# Patient Record
Sex: MALE | Race: OTHER | Employment: FULL TIME | URBAN - METROPOLITAN AREA
[De-identification: names, ages, dates, MRNs, and addresses within clinical notes are randomized per-mention and may not be internally consistent; named-entity substitution may affect disease eponyms.]

---

## 2024-06-06 ENCOUNTER — APPOINTMENT (EMERGENCY)
Dept: RADIOLOGY | Facility: HOSPITAL | Age: 34
End: 2024-06-06
Payer: COMMERCIAL

## 2024-06-06 ENCOUNTER — HOSPITAL ENCOUNTER (EMERGENCY)
Facility: HOSPITAL | Age: 34
Discharge: HOME | End: 2024-06-06
Attending: EMERGENCY MEDICINE | Admitting: EMERGENCY MEDICINE
Payer: COMMERCIAL

## 2024-06-06 VITALS
WEIGHT: 218.92 LBS | DIASTOLIC BLOOD PRESSURE: 77 MMHG | TEMPERATURE: 99.5 F | RESPIRATION RATE: 16 BRPM | BODY MASS INDEX: 32.33 KG/M2 | SYSTOLIC BLOOD PRESSURE: 127 MMHG | OXYGEN SATURATION: 99 % | HEART RATE: 93 BPM

## 2024-06-06 DIAGNOSIS — J02.0 STREP PHARYNGITIS: Primary | ICD-10-CM

## 2024-06-06 LAB — S PYO DNA THROAT QL NAA+PROBE: DETECTED

## 2024-06-06 PROCEDURE — 71046 X-RAY EXAM CHEST 2 VIEWS: CPT

## 2024-06-06 PROCEDURE — 0241U HB NFCT DS VIR RESP RNA 4 TRGT: CPT

## 2024-06-06 PROCEDURE — 87651 STREP A DNA AMP PROBE: CPT

## 2024-06-06 PROCEDURE — 99285 EMERGENCY DEPT VISIT HI MDM: CPT

## 2024-06-06 PROCEDURE — 99284 EMERGENCY DEPT VISIT MOD MDM: CPT

## 2024-06-06 RX ORDER — AMOXICILLIN 250 MG/1
500 CAPSULE ORAL ONCE
Status: COMPLETED | OUTPATIENT
Start: 2024-06-06 | End: 2024-06-06

## 2024-06-06 RX ORDER — DEXAMETHASONE 4 MG/1
6 TABLET ORAL ONCE
Status: COMPLETED | OUTPATIENT
Start: 2024-06-06 | End: 2024-06-06

## 2024-06-06 RX ORDER — NAPROXEN 500 MG/1
500 TABLET ORAL 2 TIMES DAILY WITH MEALS
Qty: 30 TABLET | Refills: 0 | Status: SHIPPED | OUTPATIENT
Start: 2024-06-06

## 2024-06-06 RX ORDER — LIDOCAINE HYDROCHLORIDE 20 MG/ML
15 SOLUTION OROPHARYNGEAL ONCE
Status: COMPLETED | OUTPATIENT
Start: 2024-06-06 | End: 2024-06-06

## 2024-06-06 RX ORDER — AMOXICILLIN 500 MG/1
500 CAPSULE ORAL EVERY 12 HOURS SCHEDULED
Qty: 20 CAPSULE | Refills: 0 | Status: SHIPPED | OUTPATIENT
Start: 2024-06-06 | End: 2024-06-16

## 2024-06-06 RX ADMIN — AMOXICILLIN 500 MG: 250 CAPSULE ORAL at 22:56

## 2024-06-06 RX ADMIN — LIDOCAINE HYDROCHLORIDE 15 ML: 20 SOLUTION ORAL at 22:24

## 2024-06-06 RX ADMIN — DEXAMETHASONE 6 MG: 4 TABLET ORAL at 22:24

## 2024-06-06 NOTE — Clinical Note
Prabhjot Calderafabybrissa was seen and treated in our emergency department on 6/6/2024.                Diagnosis: Pharyngitis    Prabhjot  .    He may return on this date: 06/10/2024         If you have any questions or concerns, please don't hesitate to call.      Andre Ellington PA-C    ______________________________           _______________          _______________  Hospital Representative                              Date                                Time

## 2024-06-07 LAB
FLUAV RNA RESP QL NAA+PROBE: NEGATIVE
FLUBV RNA RESP QL NAA+PROBE: NEGATIVE
RSV RNA RESP QL NAA+PROBE: NEGATIVE
SARS-COV-2 RNA RESP QL NAA+PROBE: NEGATIVE

## 2024-06-07 NOTE — ED PROVIDER NOTES
History  Chief Complaint   Patient presents with    Fever     Fever started this am, Took tylenol at 2045 and 30 mins later had a temp of 101, neck, stomach and throat pain, +cough with yellow phlegm,      Patient 34-year-old male presented ED with a chief complaint of fevers and sore throat.  Patient has significant past medical history of substance use disorder.  Patient states that today he has been sore throat.  Patient stated he also has a cough where he is coughing up some whitish-yellow sputum.  Denies any sick contacts or being seen by another provider.  Patient stated that before taking Tylenol that he also had intense sore throat in which he felt like he has had some shortness of breath.  Patient denies any trouble swallowing but he said that it is painful.  Denies any decrease in oral intake.  Patient stated that his fever today was high as 101.  States he took Tylenol before coming to the ED today.  Patient denies any feeling of obstruction.  Denies any trismus.  States that throughout some chills with fever.  Patient also stated that he had nonspecific abdominal pain but he also has experienced this on a normal basis.  Patient rates the pain a 7 out of 10 stating that the Tylenol did help some.Patient denies any chest pain, shortness of breath, vomiting, diarrhea, chills, diaphoresis, fevers, loss of consciousness, syncope, urinary and bowel changes, abdominal pain, visual symptoms, vision loss, loss of function, loss of sensation, decreased oral intake, hemoptysis, hematochezia, hematemesis, melena, confusion.       ff    Prior to Admission Medications   Prescriptions Last Dose Informant Patient Reported? Taking?   naproxen (EC NAPROSYN) 500 MG EC tablet   No No   Sig: Take 1 tablet (500 mg total) by mouth in the morning and 1 tablet (500 mg total) in the evening. Take with meals.   oxyCODONE (ROXICODONE) 5 immediate release tablet   No No   Sig: Take 1 tablet (5 mg total) by mouth every 6 (six) hours  as needed for severe pain for up to 12 doses Max Daily Amount: 20 mg      Facility-Administered Medications: None       Past Medical History:   Diagnosis Date    Drug addiction in remission (HCC)     clean since 2019    History of transfusion     as an infant    No known health problems     Personal history of COVID-19 01/28/2022    mild symptoms, recovered at home       Past Surgical History:   Procedure Laterality Date    ELBOW SURGERY Right     IN OPEN TX PHALANGEAL SHAFT FRACTURE PROX/MIDDLE EA Right 5/23/2022    Procedure: OPEN REDUCTION W/ INTERNAL FIXATION (ORIF) HAND, 4th and 5th metacarpal ORIF and all necessary reconstructive procedures;  Surgeon: Jeff Lozada;  Location:  MAIN OR;  Service: Orthopedics    TOOTH EXTRACTION         No family history on file.  I have reviewed and agree with the history as documented.    E-Cigarette/Vaping    E-Cigarette Use Never User      E-Cigarette/Vaping Substances     Social History     Tobacco Use    Smoking status: Every Day     Current packs/day: 1.00     Average packs/day: 1 pack/day for 15.0 years (15.0 ttl pk-yrs)     Types: Cigarettes    Smokeless tobacco: Never   Vaping Use    Vaping status: Never Used   Substance Use Topics    Alcohol use: Not Currently     Comment: stopped drinking alcohol when stopped using cocaine & heroin    Drug use: Not Currently     Types: Heroin, Cocaine     Comment: wife states no drug use since end 2019       Review of Systems   Constitutional:  Negative for chills, diaphoresis, fatigue and fever.   HENT:  Positive for sore throat. Negative for congestion, ear discharge, ear pain, postnasal drip, rhinorrhea, sinus pressure and sinus pain.    Eyes:  Negative for photophobia, pain, discharge, itching and visual disturbance.   Respiratory:  Positive for cough. Negative for chest tightness and shortness of breath.    Cardiovascular:  Negative for chest pain and palpitations.   Gastrointestinal:  Negative for abdominal distention,  abdominal pain, constipation, diarrhea, nausea and vomiting.   Genitourinary:  Negative for difficulty urinating and dysuria.   Musculoskeletal:  Positive for myalgias and neck pain. Negative for arthralgias, back pain, joint swelling and neck stiffness.   Skin:  Negative for rash and wound.   Neurological:  Negative for dizziness, tremors, syncope, facial asymmetry, weakness, light-headedness, numbness and headaches.   Hematological:  Positive for adenopathy.       Physical Exam  Physical Exam  Vitals and nursing note reviewed.   Constitutional:       General: He is not in acute distress.     Appearance: Normal appearance. He is normal weight. He is not ill-appearing, toxic-appearing or diaphoretic.   HENT:      Head: Normocephalic.      Right Ear: Tympanic membrane, ear canal and external ear normal.      Left Ear: Tympanic membrane, ear canal and external ear normal.      Nose: Nose normal. No congestion.      Mouth/Throat:      Mouth: Mucous membranes are moist.      Pharynx: Oropharyngeal exudate and posterior oropharyngeal erythema present.      Comments: Bilateral tonsillar edema and erythema.  Exudates present on exam.  Airway is patent.  No signs of PTA on exam.  Uvula midline  Eyes:      General:         Right eye: No discharge.         Left eye: No discharge.      Extraocular Movements: Extraocular movements intact.      Conjunctiva/sclera: Conjunctivae normal.      Pupils: Pupils are equal, round, and reactive to light.   Cardiovascular:      Rate and Rhythm: Normal rate and regular rhythm.      Pulses: Normal pulses.   Pulmonary:      Effort: Pulmonary effort is normal. No respiratory distress.      Breath sounds: Normal breath sounds. No stridor. No wheezing, rhonchi or rales.   Chest:      Chest wall: No tenderness.   Abdominal:      General: Bowel sounds are normal. There is no distension.      Palpations: Abdomen is soft.      Tenderness: There is no abdominal tenderness. There is no right CVA  tenderness, left CVA tenderness or guarding.   Musculoskeletal:         General: No tenderness. Normal range of motion.      Cervical back: Normal range of motion and neck supple.   Lymphadenopathy:      Cervical: Cervical adenopathy present.   Skin:     General: Skin is warm and dry.      Capillary Refill: Capillary refill takes less than 2 seconds.      Findings: No rash.   Neurological:      Mental Status: He is alert and oriented to person, place, and time.   Psychiatric:         Mood and Affect: Mood normal.         Vital Signs  ED Triage Vitals [06/06/24 2141]   Temperature Pulse Respirations Blood Pressure SpO2   99.5 °F (37.5 °C) (!) 112 20 146/65 96 %      Temp Source Heart Rate Source Patient Position - Orthostatic VS BP Location FiO2 (%)   Oral Monitor Sitting Right arm --      Pain Score       7           Vitals:    06/06/24 2141 06/06/24 2246   BP: 146/65 127/77   Pulse: (!) 112 93   Patient Position - Orthostatic VS: Sitting Sitting         Visual Acuity      ED Medications  Medications   dexamethasone (DECADRON) tablet 6 mg (6 mg Oral Given 6/6/24 2224)   Lidocaine Viscous HCl (XYLOCAINE) 2 % mucosal solution 15 mL (15 mL Swish & Spit Given 6/6/24 2224)   amoxicillin (AMOXIL) capsule 500 mg (500 mg Oral Given 6/6/24 2256)       Diagnostic Studies  Results Reviewed       Procedure Component Value Units Date/Time    FLU/RSV/COVID - if FLU/RSV clinically relevant [[id number]]  (Normal) Collected: 06/06/24 2217    Lab Status: Final result Specimen: Nares from Nose Updated: 06/07/24 0026     SARS-CoV-2 Negative     INFLUENZA A PCR Negative     INFLUENZA B PCR Negative     RSV PCR Negative    Narrative:      FOR PEDIATRIC PATIENTS - copy/paste COVID Guidelines URL to browser: [email or web address]    SARS-CoV-2 assay is a Nucleic Acid Amplification assay intended for the  qualitative detection of nucleic acid from SARS-CoV-2 in nasopharyngeal  swabs.  Results are for the presumptive identification of SARS-CoV-2 RNA.    Positive results are indicative of infection with SARS-CoV-2, the virus  causing COVID-19, but do not rule out bacterial infection or co-infection  with other viruses. Laboratories within the [place] and its  territories are required to report all positive results to the appropriate  public health authorities. Negative results do not preclude SARS-CoV-2  infection and should not be used as the sole basis for treatment or other  patient management decisions. Negative results must be combined with  clinical observations, patient history, and epidemiological information.  This test has not been FDA cleared or approved.    This test has been authorized by FDA under an Emergency Use Authorization  (EUA). This test is only authorized for the duration of time the  declaration that circumstances exist justifying the authorization of the  emergency use of an in vitro diagnostic tests for detection of SARS-CoV-2  virus and/or diagnosis of COVID-19 infection under section 564(b)(1) of  the Act, 21 U.S.C. 360bbb-3(b)(1), unless the authorization is terminated  or revoked sooner. The test has been validated but independent review by FDA  and CLIA is pending.    Test performed using [employer] GeneXpert: This RT-PCR assay targets N2,  a region unique to SARS-CoV-2. A conserved region in the E-gene was chosen  for pan-Sarbecovirus detection which includes SARS-CoV-2.    According to CMS-2020-01-R, this platform meets the definition of high-throughput technology.    Strep A PCR [[id number]]  (Abnormal) Collected: 06/06/24 2217    Lab Status: Final result Specimen: Throat Updated: 06/06/24 2248     STREP A PCR Detected                   XR chest 2 views   ED Interpretation by Andre Ellington PA-C (06/06 2326)   Increased vascular markings.  Possible reactive airway.   no other acute findings noted on CXR.                 Procedures  Procedures         ED  Course                               SBIRT 22yo+      Flowsheet Row Most Recent Value   Initial Alcohol Screen: US AUDIT-C     1. How often do you have a drink containing alcohol? 0 Filed at: 06/06/2024 2209   2. How many drinks containing alcohol do you have on a typical day you are drinking?  0 Filed at: 06/06/2024 2209   3a. Male UNDER 65: How often do you have five or more drinks on one occasion? 0 Filed at: 06/06/2024 2209   Audit-C Score 0 Filed at: 06/06/2024 2209   BECK: How many times in the past year have you...    Used an illegal drug or used a prescription medication for non-medical reasons? Never Filed at: 06/06/2024 2209                      Medical Decision Making  Patient 34 male presented to ED with a chief complaint of sore throat neck pain cough with sputum production.  Cardiopulmonary exam was benign.  Tympanic membranes were clear bilaterally.  Patient had bilateral tonsil edema and erythema.  Airway patent no signs of PTA on exam.  Uvula midline.  There are exudates present on exam.  Abdominal exam benign.  Patient did have some abdominal tenderness around the periumbilical region but states that this is normal for him.  Patient did have anterior cervical adenopathy.  Chest x-ray shows no acute cardiopulmonary disease.  Possible reactive airway but patient is also a smoker.  No consolidation noted on exam.  No pneumothorax noted.  Viral panel ordered along with rapid strep.  Viral panel pending.  Rapid strep was positive.  Patient treated with Decadron and amoxicillin in the ED.  Patient's vitals did improve significantly throughout ED course.  Patient did not present with fever but stated they also took Tylenol right before coming to the ED.  Patient given 10-day course of sent to the pharmacy for amoxicillin.  Patient also given Naprosyn for symptomatic relief.  Patient given strict return to ED protocol which she understood and had no further questions.  No trismus noted on exam.Patient  "understood diagnosis and treatment plan and had no further questions.  Patient was discharged in stable condition.  Patient was advised to follow-up with her PCP in 1 to 2 days.  Patient was advised to return to the ED with any worsening symptoms that were explained on discharge including but not limited to chest pain, shortness of breath, irretractable vomiting or diarrhea, vision loss, loss of function, loss of sensation, syncope, hemoptysis, hematochezia, hematemesis, melena, decreased oral intake, feeling ill.     Ddx-strep pharyngitis, pharyngitis, viral pharyngitis, acute tonsillitis, COVID, flu, RSV, viral illness, viral syndrome    Portions of the record may have been created with voice recognition software. Occasional wrong word or \"sound a like\" substitutions may have occurred due to the inherent limitations of voice recognition software. Read the chart carefully and recognize, using context, where substitutions have occurred.      Amount and/or Complexity of Data Reviewed  Labs: ordered.     Details: Details in MDM  Radiology: ordered and independent interpretation performed.     Details: Details in MDM    Risk  OTC drugs.  Prescription drug management.             Disposition  Final diagnoses:   Strep pharyngitis     Time reflects when diagnosis was documented in both MDM as applicable and the Disposition within this note       Time User Action Codes Description Comment    6/6/2024 11:18 PM Andre Ellington Add [J02.0] Strep pharyngitis           ED Disposition       ED Disposition   Discharge    Condition   Stable    Date/Time   Thu Jun 6, 2024 8247    Comment   Prabhjot Calderadoconcepcion discharge to home/self care.                   Follow-up Information       Follow up With Specialties Details Why Contact Info Additional Information    [facility]   [address] [place] [address] [phone] [facility] " Practice [place], [address] [place] [address], [address], [place], [place], [address] [phone]    [facility] Emergency Medicine   [address] [facility] [address] [phone] [facility], [address], [place], [address]            Discharge Medication List as of 6/6/2024 11:22 PM        START taking these medications    Details   amoxicillin (AMOXIL) 500 mg capsule Take 1 capsule (500 mg total) by mouth every 12 (twelve) hours for 10 days, Starting Thu 6/6/2024, Until Sun 6/16/2024, Normal      naproxen (Naprosyn) 500 mg tablet Take 1 tablet (500 mg total) by mouth 2 (two) times a day with meals, Starting Thu 6/6/2024, Normal           CONTINUE these medications which have NOT CHANGED    Details   naproxen (EC NAPROSYN) 500 MG EC tablet Take 1 tablet (500 mg total) by mouth in the morning and 1 tablet (500 mg total) in the evening. Take with meals., Starting Mon 5/16/2022, Until Tue 5/16/2023, Print      oxyCODONE (ROXICODONE) 5 immediate release tablet Take 1 tablet (5 mg total) by mouth every 6 (six) hours as needed for severe pain for up to 12 doses Max Daily Amount: 20 mg, Starting Mon 5/23/2022, Normal             No discharge procedures on file.    PDMP Review       None            ED Provider  Electronically Signed by             Andre Ellington PA-C  06/07/24 0027

## 2024-06-07 NOTE — DISCHARGE INSTRUCTIONS
Patient follow-up PCP for today's ED visit.  Patient return to ED with any worsening symptoms explained on discharge.  Patient advised to take prescribed medication as prescribed.

## 2024-09-10 ENCOUNTER — APPOINTMENT (EMERGENCY)
Dept: RADIOLOGY | Facility: HOSPITAL | Age: 34
End: 2024-09-10
Payer: COMMERCIAL

## 2024-09-10 ENCOUNTER — HOSPITAL ENCOUNTER (EMERGENCY)
Facility: HOSPITAL | Age: 34
Discharge: HOME | End: 2024-09-10
Attending: EMERGENCY MEDICINE
Payer: COMMERCIAL

## 2024-09-10 VITALS
SYSTOLIC BLOOD PRESSURE: 152 MMHG | BODY MASS INDEX: 31.44 KG/M2 | WEIGHT: 212.3 LBS | HEIGHT: 69 IN | DIASTOLIC BLOOD PRESSURE: 83 MMHG | TEMPERATURE: 98.6 F | RESPIRATION RATE: 18 BRPM | HEART RATE: 97 BPM | OXYGEN SATURATION: 99 %

## 2024-09-10 DIAGNOSIS — M62.838 TRAPEZIUS MUSCLE SPASM: Primary | ICD-10-CM

## 2024-09-10 PROCEDURE — 99284 EMERGENCY DEPT VISIT MOD MDM: CPT

## 2024-09-10 PROCEDURE — 72040 X-RAY EXAM NECK SPINE 2-3 VW: CPT

## 2024-09-10 PROCEDURE — 99283 EMERGENCY DEPT VISIT LOW MDM: CPT

## 2024-09-10 PROCEDURE — 96372 THER/PROPH/DIAG INJ SC/IM: CPT

## 2024-09-10 RX ORDER — METHOCARBAMOL 500 MG/1
500 TABLET, FILM COATED ORAL 2 TIMES DAILY
Qty: 10 TABLET | Refills: 0 | Status: SHIPPED | OUTPATIENT
Start: 2024-09-10

## 2024-09-10 RX ORDER — LIDOCAINE 50 MG/G
1 PATCH TOPICAL ONCE
Status: DISCONTINUED | OUTPATIENT
Start: 2024-09-10 | End: 2024-09-10 | Stop reason: HOSPADM

## 2024-09-10 RX ORDER — KETOROLAC TROMETHAMINE 30 MG/ML
15 INJECTION, SOLUTION INTRAMUSCULAR; INTRAVENOUS ONCE
Status: COMPLETED | OUTPATIENT
Start: 2024-09-10 | End: 2024-09-10

## 2024-09-10 RX ORDER — METHOCARBAMOL 750 MG/1
750 TABLET, FILM COATED ORAL ONCE
Status: COMPLETED | OUTPATIENT
Start: 2024-09-10 | End: 2024-09-10

## 2024-09-10 RX ORDER — LIDOCAINE 50 MG/G
1 PATCH TOPICAL DAILY
Qty: 3 PATCH | Refills: 0 | Status: SHIPPED | OUTPATIENT
Start: 2024-09-10

## 2024-09-10 RX ORDER — NAPROXEN 500 MG/1
500 TABLET ORAL 2 TIMES DAILY WITH MEALS
Qty: 30 TABLET | Refills: 0 | Status: SHIPPED | OUTPATIENT
Start: 2024-09-10

## 2024-09-10 RX ADMIN — LIDOCAINE 1 PATCH: 50 PATCH TOPICAL at 19:28

## 2024-09-10 RX ADMIN — METHOCARBAMOL 750 MG: 750 TABLET ORAL at 19:28

## 2024-09-10 RX ADMIN — KETOROLAC TROMETHAMINE 15 MG: 30 INJECTION, SOLUTION INTRAMUSCULAR; INTRAVENOUS at 19:28

## 2024-09-10 NOTE — Clinical Note
Prabhjot Rosas was seen and treated in our emergency department on 9/10/2024.                Diagnosis: Trapezius muscle spasm    Prabhjot  may return to work on return date.    He may return on this date: 09/12/2024         If you have any questions or concerns, please don't hesitate to call.      Andre Ellington PA-C    ______________________________           _______________          _______________  Hospital Representative                              Date                                Time

## 2024-09-10 NOTE — ED PROVIDER NOTES
1. Trapezius muscle spasm      ED Disposition       ED Disposition   Discharge    Condition   Stable    Date/Time   Tue Sep 10, 2024  7:54 PM    Comment   Prabhjot Rosas discharge to home/self care.                   Assessment & Plan       Medical Decision Making  34-year-old male presented ED with a chief complaint of stiff and painful neck.  Patient denies taking medications before coming to ED.  On exam patient unable to rotate the neck to the right.  He also has problem with lateral flexion to the left side.  Patient stated that the pain is radiating into his upper back.  Pain is all along the musculature of the upper back.  Likely a trapezius muscle spasm/torticollis.  X-ray was ordered which showed no acute osseous abnormalities per this writer.  Patient given Toradol methocarbamol and lidocaine patch in ED which did help to relieve the symptoms.  Patient still having a little bit of a stiff neck on exam but medication sent to the pharmacy for symptom relief.  Patient given ambulatory referral to [facility] for follow-up.  Advised patient to continue to use the medication prescribed and perform gentle range of motion exercises daily until the neck starts to loosen back up.  Patient advised also he can use heat but do not place heat over lidocaine patch.  Also advised gentle massaging to the area to help break up any adhesions.  Patient given strict return to ED protocol with any worsening symptoms explained on discharge.  Disposition thoroughly explained with follow-ups.  No loss sensation or loss of function on exam.Patient understood diagnosis and treatment plan and had no further questions.  Patient was discharged in stable condition.  Patient was advised to follow-up with her PCP in 1 to 2 days.  Patient was advised to return to the ED with any worsening symptoms that were explained on discharge including but not limited to chest pain, shortness of breath, irretractable vomiting or  "diarrhea, vision loss, loss of function, loss of sensation, syncope, hemoptysis, hematochezia, hematemesis, melena, decreased oral intake, feeling ill.  Patient was advised not to operate machinery while taking methocarbamol.    Ddx-trapezius muscle spasm, torticollis, muscle strain, fracture, spondylosis, spondylolisthesis    Portions of the record may have been created with voice recognition software. Occasional wrong word or \"sound a like\" substitutions may have occurred due to the inherent limitations of voice recognition software. Read the chart carefully and recognize, using context, where substitutions have occurred.      Amount and/or Complexity of Data Reviewed  Radiology: ordered and independent interpretation performed.     Details: See MDM    Risk  OTC drugs.  Prescription drug management.  Risk Details: Risk of worsening symptoms along with signs and symptoms worsening symptoms were thoroughly explained on discharge.  Risk of incomplete follow-up was discussed.  Patient had full understanding of all risks had no further questions and was discharged in stable condition.                        Medications   ketorolac (TORADOL) injection 15 mg (15 mg Intramuscular Given 9/10/24 1928)   methocarbamol (ROBAXIN) tablet 750 mg (750 mg Oral Given 9/10/24 1928)       History of Present Illness       34-year-old male presented ED with a chief complaint of neck pain and neck stiffness x 1 week.  Patient stated that around a week ago he feels as though he slept on his neck wrong and has had continued pain and stiffness since now.  Patient states that the pain has come to a point where he has to keep his neck in a tilted position and is unable to move it out of that position.  He rates the pain a 10 out of 10.  He stated that the pain radiates down to his upper back.  Patient denies any focal deficits or focal weaknesses.  He states that he is unable to turn his head to the right.  Patient denies any trauma to the " area.  Denies any chills fevers diaphoresis.  Denies any recent sick contacts or recent illnesses.  Patient stated that he has been using over-the-counter medication with no relief.  He endorses pain with abduction and any movement of the neck.Patient denies any chest pain, shortness of breath, vomiting, diarrhea, chills, diaphoresis, fevers, loss of consciousness, syncope, urinary and bowel changes, abdominal pain, visual symptoms, vision loss, loss of function, loss of sensation, decreased oral intake, hemoptysis, hematochezia, hematemesis, melena, confusion.         Prabhjot Rosas is a 34 y.o. male who identifies as a male presenting to the Emergency Department for neck pain    Review of Systems   Constitutional:  Negative for chills, diaphoresis, fatigue and fever.   HENT:  Negative for congestion, ear discharge, ear pain, postnasal drip, rhinorrhea, sinus pressure, sinus pain and sore throat.    Eyes:  Negative for photophobia and visual disturbance.   Respiratory:  Negative for cough, chest tightness and shortness of breath.    Cardiovascular:  Negative for chest pain and palpitations.   Gastrointestinal:  Negative for abdominal distention, abdominal pain, constipation, diarrhea, nausea and vomiting.   Genitourinary:  Negative for difficulty urinating, dysuria, flank pain, frequency and hematuria.   Musculoskeletal:  Positive for back pain, neck pain and neck stiffness. Negative for arthralgias, joint swelling and myalgias.   Skin:  Negative for rash and wound.   Neurological:  Negative for dizziness, tremors, syncope, facial asymmetry, weakness, light-headedness, numbness and headaches.           Objective     ED Triage Vitals   Temperature Pulse Blood Pressure Respirations SpO2 Patient Position - Orthostatic VS   09/10/24 1732 09/10/24 1732 09/10/24 1733 09/10/24 1732 09/10/24 1732 09/10/24 1733   98.6 °F (37 °C) 97 152/83 18 99 % Sitting      Temp Source Heart Rate Source BP Location FiO2 (%)  Pain Score    09/10/24 1732 09/10/24 1732 09/10/24 1733 -- 09/10/24 1732    Temporal Monitor Right arm  9        Physical Exam  Vitals and nursing note reviewed.   Constitutional:       General: He is not in acute distress.     Appearance: Normal appearance. He is normal weight. He is not ill-appearing, toxic-appearing or diaphoretic.   HENT:      Head: Normocephalic.      Right Ear: Tympanic membrane, ear canal and external ear normal.      Left Ear: Tympanic membrane, ear canal and external ear normal.      Nose: Nose normal. No congestion or rhinorrhea.      Mouth/Throat:      Mouth: Mucous membranes are moist.      Pharynx: No oropharyngeal exudate or posterior oropharyngeal erythema.   Eyes:      Extraocular Movements: Extraocular movements intact.      Conjunctiva/sclera: Conjunctivae normal.      Pupils: Pupils are equal, round, and reactive to light.   Cardiovascular:      Rate and Rhythm: Normal rate and regular rhythm.      Pulses: Normal pulses.   Pulmonary:      Effort: Pulmonary effort is normal. No respiratory distress.      Breath sounds: Normal breath sounds. No stridor. No wheezing, rhonchi or rales.   Chest:      Chest wall: No tenderness.   Abdominal:      General: Bowel sounds are normal. There is no distension.      Palpations: Abdomen is soft.      Tenderness: There is no abdominal tenderness. There is no right CVA tenderness, left CVA tenderness, guarding or rebound.   Musculoskeletal:         General: Tenderness present. No swelling, deformity or signs of injury. Normal range of motion.      Cervical back: Normal range of motion. Tenderness present. No rigidity.      Comments: Tenderness palpation to the SCM and neck muscles in the posterior neck.  Pain is on the right side.  No pain to the left side of the neck.  Patient also have pain into the upper back along the paracervical muscles.  Pain with abduction.  Patient has no range of motion in rotation on exam.  Exam is limited due to  patient's pain.  Will x-ray to further evaluate   Lymphadenopathy:      Cervical: No cervical adenopathy.   Skin:     General: Skin is warm and dry.      Capillary Refill: Capillary refill takes less than 2 seconds.      Findings: No rash.   Neurological:      General: No focal deficit present.      Mental Status: He is alert and oriented to person, place, and time.      Sensory: No sensory deficit.   Psychiatric:         Mood and Affect: Mood normal.         Labs Reviewed - No data to display  No orders to display       Procedures       Andre Ellington PA-C  09/11/24 [id number]

## 2024-09-10 NOTE — DISCHARGE INSTRUCTIONS
Patient advised to follow-up PCP for today's ED visit.  Patient vies to return to the ED with any worsening symptoms explained on discharge.  Ambulatory referral to [facility] made.  Patient should not operate any machinery while taking methocarbamol. Patient was advised to return to the ED with any worsening symptoms that were explained on discharge including but not limited to chest pain, shortness of breath, irretractable vomiting or diarrhea, vision loss, loss of function, loss of sensation, syncope, hemoptysis, hematochezia, hematemesis, melena, decreased oral intake, feeling ill.

## 2024-09-11 ENCOUNTER — TELEPHONE (OUTPATIENT)
Dept: PHYSICAL THERAPY | Facility: OTHER | Age: 34
End: 2024-09-11

## 2024-09-11 NOTE — TELEPHONE ENCOUNTER
Call placed to the patient per [facility] referral.    A woman answered the phone, after asking to speak to the Pt I was hung up on.     This is the 1st attempt to reach the patient.  Will defer per protocol.

## 2024-09-17 ENCOUNTER — TELEPHONE (OUTPATIENT)
Dept: PHYSICAL THERAPY | Facility: OTHER | Age: 34
End: 2024-09-17

## 2024-09-17 NOTE — TELEPHONE ENCOUNTER
Call placed to the patient per [facility] referral.    Voice message left for patient to call back. Phone number and hours of business provided.     This is the 2nd attempt to reach the patient.   Referral closed.